# Patient Record
Sex: FEMALE | Race: WHITE | Employment: UNEMPLOYED | URBAN - METROPOLITAN AREA
[De-identification: names, ages, dates, MRNs, and addresses within clinical notes are randomized per-mention and may not be internally consistent; named-entity substitution may affect disease eponyms.]

---

## 2023-08-24 ENCOUNTER — PROCEDURE VISIT (OUTPATIENT)
Dept: AUDIOLOGY | Age: 83
End: 2023-08-24
Payer: MEDICARE

## 2023-08-24 ENCOUNTER — OFFICE VISIT (OUTPATIENT)
Dept: ENT CLINIC | Age: 83
End: 2023-08-24
Payer: MEDICARE

## 2023-08-24 VITALS
BODY MASS INDEX: 28.74 KG/M2 | DIASTOLIC BLOOD PRESSURE: 70 MMHG | OXYGEN SATURATION: 98 % | TEMPERATURE: 97.3 F | HEIGHT: 63 IN | SYSTOLIC BLOOD PRESSURE: 153 MMHG | HEART RATE: 64 BPM | WEIGHT: 162.2 LBS

## 2023-08-24 DIAGNOSIS — H93.12 TINNITUS OF LEFT EAR: ICD-10-CM

## 2023-08-24 DIAGNOSIS — H90.3 ASYMMETRIC SNHL (SENSORINEURAL HEARING LOSS): Primary | ICD-10-CM

## 2023-08-24 DIAGNOSIS — R42 DIZZINESS: ICD-10-CM

## 2023-08-24 DIAGNOSIS — R26.89 IMBALANCE: ICD-10-CM

## 2023-08-24 DIAGNOSIS — H90.3 ASYMMETRICAL SENSORINEURAL HEARING LOSS: Primary | ICD-10-CM

## 2023-08-24 PROCEDURE — 1036F TOBACCO NON-USER: CPT | Performed by: OTOLARYNGOLOGY

## 2023-08-24 PROCEDURE — 92557 COMPREHENSIVE HEARING TEST: CPT | Performed by: AUDIOLOGIST

## 2023-08-24 PROCEDURE — 99204 OFFICE O/P NEW MOD 45 MIN: CPT | Performed by: OTOLARYNGOLOGY

## 2023-08-24 PROCEDURE — 1090F PRES/ABSN URINE INCON ASSESS: CPT | Performed by: OTOLARYNGOLOGY

## 2023-08-24 PROCEDURE — G8419 CALC BMI OUT NRM PARAM NOF/U: HCPCS | Performed by: OTOLARYNGOLOGY

## 2023-08-24 PROCEDURE — G8427 DOCREV CUR MEDS BY ELIG CLIN: HCPCS | Performed by: OTOLARYNGOLOGY

## 2023-08-24 PROCEDURE — 1123F ACP DISCUSS/DSCN MKR DOCD: CPT | Performed by: OTOLARYNGOLOGY

## 2023-08-24 PROCEDURE — G8400 PT W/DXA NO RESULTS DOC: HCPCS | Performed by: OTOLARYNGOLOGY

## 2023-08-24 PROCEDURE — 92567 TYMPANOMETRY: CPT | Performed by: AUDIOLOGIST

## 2023-08-24 RX ORDER — FLUOXETINE 10 MG/1
CAPSULE ORAL
COMMUNITY
Start: 2023-08-04

## 2023-08-24 RX ORDER — CARVEDILOL 12.5 MG/1
12.5 TABLET ORAL 2 TIMES DAILY WITH MEALS
COMMUNITY
Start: 2023-06-09

## 2023-08-24 RX ORDER — ASPIRIN 325 MG
TABLET ORAL
COMMUNITY

## 2023-08-24 RX ORDER — LISINOPRIL 40 MG/1
TABLET ORAL
COMMUNITY
Start: 2023-06-29

## 2023-08-24 RX ORDER — MULTIVITAMIN WITH IRON
TABLET ORAL
COMMUNITY

## 2023-08-24 RX ORDER — HYDROCHLOROTHIAZIDE 25 MG/1
TABLET ORAL
COMMUNITY
Start: 2023-08-04

## 2023-08-24 ASSESSMENT — ENCOUNTER SYMPTOMS
SINUS PRESSURE: 0
SHORTNESS OF BREATH: 0
COUGH: 0
VOICE CHANGE: 0
APNEA: 0
EYE ITCHING: 0
SORE THROAT: 0
FACIAL SWELLING: 0
TROUBLE SWALLOWING: 0

## 2023-08-24 NOTE — PROGRESS NOTES
[address], [address] [place] [address], [address] [place] [address],[address]  P: [phone]       Patient     Dada Giron  1940    ChiefComplaint     Chief Complaint   Patient presents with    Tinnitus     Patient is here today for tinnitus. Patient states she has had ringing for a few years off and on and now it is constant. Patient states she has been having trouble hearing from left ear for the same amount of time. Patient denies any history of ear problems. History of Present Illness     Florencio Conklin is an 82-year-old female here today for evaluation of left-sided hearing loss, imbalance and tinnitus. Reports progressive hearing loss over the last year with gradually worsening tinnitus in the left. Also reports of her poor balance with frequent stumbling. Denies otalgia, otorrhea, vertigo. No history of head trauma or previous ear surgeries or recurrent ear infections. Past Medical History     History reviewed. No pertinent past medical history. Past Surgical History     History reviewed. No pertinent surgical history. Family History     History reviewed. No pertinent family history.     Social History     Social History     Tobacco Use    Smoking status: Former     Types: Cigarettes    Smokeless tobacco: Never   Vaping Use    Vaping Use: Never used   Substance Use Topics    Alcohol use: Yes     Comment: social    Drug use: Never        Allergies     Allergies   Allergen Reactions    Nickel        Medications     Current Outpatient Medications   Medication Sig Dispense Refill    magnesium (MAGNESIUM-OXIDE) 250 MG TABS tablet Take by mouth      Ascorbic Acid  MG CPCR Take 1 capsule by mouth      aspirin 325 MG tablet Take by mouth      carvedilol (COREG) 12.5 MG tablet Take 1 tablet by mouth with breakfast and with evening meal      vitamin D3 (CHOLECALCIFEROL) 125 MCG (5000 UT) TABS tablet Take by mouth      FLUoxetine (PROZAC) 10 MG capsule TAKE 1 CAPSULE BY ORAL ROUTE

## 2023-08-24 NOTE — PROGRESS NOTES
Kari Gave Wi   1940, 82 y.o. female   [id number]       Referring Provider: Richard Stein DO   Referral Type: In an order in [address] [place] [address]    Reason for Visit: Evaluation of the cause of disorders of hearing, tinnitus, or balance. ADULT AUDIOLOGIC EVALUATION      Darvin Judd is a 82 y.o. female seen today, 8/24/2023 , for an initial audiologic evaluation. Patient was seen by Richard Steni DO  following today's evaluation. AUDIOLOGIC AND OTHER PERTINENT MEDICAL HISTORY:      Darvin Judd reports a gradual decline in hearing sensitivity in the left ear over the last 5+ years. She also reports an increase in tinnitus in the left ear and an increase in dizziness/unsteadiness. No other significant otologic history reported. She denied otalgia, otorrhea, history of falls, history of significant noise exposure, history of head trauma, and history of ear surgery. 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 Poor/Very poor speech understanding when in quiet in the left ear. Tympanometry indicates normal middle ear function. Discussed test results and implications with patient. Discussed use of tinnitus management strategies. Follow medical recommendations of Richard Stein DO.    ASSESSMENT AND FINDINGS:     Otoscopy unremarkable. RIGHT EAR:  Hearing Sensitivity: Hearing within normal limits through 1.5kHz sloping to a moderate sensorineural hearing loss. Speech Recognition Threshold: 20 dB HL  Word Recognition: Excellent 100%, based on NU-6 25-word list at 60 dBHL using recorded speech stimuli. Tympanometry: Normal peak pressure and compliance, Type A tympanogram, consistent with normal middle ear function. Volume 1.3 cm3, Peak -2 daPa, 1.49 mmho. LEFT EAR:  Hearing Sensitivity: A moderate to moderately severe sensorineural hearing loss.   Speech Recognition Threshold: 55 dB HL  Word